# Patient Record
Sex: FEMALE | Race: BLACK OR AFRICAN AMERICAN | Employment: FULL TIME | URBAN - METROPOLITAN AREA
[De-identification: names, ages, dates, MRNs, and addresses within clinical notes are randomized per-mention and may not be internally consistent; named-entity substitution may affect disease eponyms.]

---

## 2018-10-07 ENCOUNTER — HOSPITAL ENCOUNTER (EMERGENCY)
Facility: HOSPITAL | Age: 33
Discharge: HOME | End: 2018-10-07
Payer: MEDICAID

## 2018-10-07 VITALS
DIASTOLIC BLOOD PRESSURE: 81 MMHG | SYSTOLIC BLOOD PRESSURE: 117 MMHG | TEMPERATURE: 98 F | WEIGHT: 134 LBS | HEART RATE: 90 BPM | OXYGEN SATURATION: 100 % | BODY MASS INDEX: 23.74 KG/M2 | HEIGHT: 63 IN | RESPIRATION RATE: 16 BRPM

## 2018-10-07 DIAGNOSIS — R19.7 DIARRHEA, UNSPECIFIED TYPE: Primary | ICD-10-CM

## 2018-10-07 PROCEDURE — 99284 EMERGENCY DEPT VISIT MOD MDM: CPT

## 2018-10-07 PROCEDURE — 81003 URINALYSIS AUTO W/O SCOPE: CPT | Performed by: NURSE PRACTITIONER

## 2018-10-07 PROCEDURE — 80048 BASIC METABOLIC PNL TOTAL CA: CPT | Performed by: NURSE PRACTITIONER

## 2018-10-07 PROCEDURE — 81025 URINE PREGNANCY TEST: CPT

## 2018-10-07 PROCEDURE — 96372 THER/PROPH/DIAG INJ SC/IM: CPT

## 2018-10-07 PROCEDURE — 85025 COMPLETE CBC W/AUTO DIFF WBC: CPT | Performed by: NURSE PRACTITIONER

## 2018-10-07 PROCEDURE — 96360 HYDRATION IV INFUSION INIT: CPT

## 2018-10-07 RX ORDER — DICYCLOMINE HCL 20 MG
20 TABLET ORAL 4 TIMES DAILY PRN
Qty: 5 TABLET | Refills: 0 | Status: SHIPPED | OUTPATIENT
Start: 2018-10-07 | End: 2018-10-11

## 2018-10-07 RX ORDER — IBUPROFEN 600 MG/1
600 TABLET ORAL EVERY 6 HOURS PRN
COMMUNITY

## 2018-10-07 RX ORDER — DICYCLOMINE HYDROCHLORIDE 10 MG/ML
20 INJECTION INTRAMUSCULAR ONCE
Status: COMPLETED | OUTPATIENT
Start: 2018-10-07 | End: 2018-10-07

## 2018-10-07 NOTE — ED PROVIDER NOTES
Patient Seen in: [facility] AND [facility] Emergency Department    History   CC: diarrhea  HPI: Clari Watts 35year old female w/ hx Sickle Cell Anemia who presents to the ER c/o diffuse lower abdominal \"cramping and pressure\" like discomfort that she notic src Oral   SpO2 98 %   O2 Device None (Room air)       Current:/81   Pulse 90   Temp 98 °F (36.7 °C) (Oral)   Resp 16   Ht 160 cm (5' 3\")   Wt 60.8 kg   LMP 09/16/2018 (Exact Date)   SpO2 100%   BMI 23.74 kg/m²         General - Appears well, non-to orders. RAINBOW DRAW BLUE   RAINBOW DRAW GOLD       [facility]     All results discussed with patient as well as Dr. Lali Vincent Patient states she feels great improvement after IV fluids and Bentyl administered in the emergency department.   Had a bowel movement

## 2018-10-07 NOTE — ED NOTES
Encouraged clear liquid diet, advance to soft bland diet as tolerated. To follow-up with PMD this week. Instructed to return to ER for any new or worsening symptoms.

## 2018-10-07 NOTE — ED INITIAL ASSESSMENT (HPI)
Awoke from sleep at approx 345am with abdominal aching // cramping, c/o 4-5 episodes of liquid stool since that time. Denies n/v or fevers. Recent travel to [place] for 1 week, returned on 10/01. No known sick contacts.

## (undated) NOTE — ED AVS SNAPSHOT
Clari Watts   MRN: [id number]    Department:  [facility] Emergency Department   Date of Visit:  10/7/2018           Disclosure     Insurance plans vary and the physician(s) referred by the ER may not be covered by your plan.  Please contact y CARE PHYSICIAN AT ONCE OR RETURN IMMEDIATELY TO THE EMERGENCY DEPARTMENT. If you have been prescribed any medication(s), please fill your prescription right away and begin taking the medication(s) as directed.   If you believe that any of the medications